# Patient Record
Sex: MALE | Race: WHITE
[De-identification: names, ages, dates, MRNs, and addresses within clinical notes are randomized per-mention and may not be internally consistent; named-entity substitution may affect disease eponyms.]

---

## 2019-02-18 ENCOUNTER — HOSPITAL ENCOUNTER (EMERGENCY)
Dept: HOSPITAL 72 - EMR | Age: 69
Discharge: HOME | End: 2019-02-18
Payer: MEDICARE

## 2019-02-18 VITALS — WEIGHT: 175 LBS | HEIGHT: 63 IN | BODY MASS INDEX: 31.01 KG/M2

## 2019-02-18 VITALS — SYSTOLIC BLOOD PRESSURE: 130 MMHG | DIASTOLIC BLOOD PRESSURE: 83 MMHG

## 2019-02-18 VITALS — DIASTOLIC BLOOD PRESSURE: 73 MMHG | SYSTOLIC BLOOD PRESSURE: 121 MMHG

## 2019-02-18 VITALS — DIASTOLIC BLOOD PRESSURE: 81 MMHG | SYSTOLIC BLOOD PRESSURE: 153 MMHG

## 2019-02-18 DIAGNOSIS — Z85.51: ICD-10-CM
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
## 2019-02-18 DIAGNOSIS — S29.011A: Primary | ICD-10-CM

## 2019-02-18 DIAGNOSIS — E78.00: ICD-10-CM

## 2019-02-18 DIAGNOSIS — I10: ICD-10-CM

## 2019-02-18 LAB
ADD MANUAL DIFF: YES
ALBUMIN SERPL-MCNC: 4 G/DL (ref 3.4–5)
ALBUMIN/GLOB SERPL: 1 {RATIO} (ref 1–2.7)
ALP SERPL-CCNC: 92 U/L (ref 46–116)
ALT SERPL-CCNC: 61 U/L (ref 12–78)
ANION GAP SERPL CALC-SCNC: 15 MMOL/L (ref 5–15)
AST SERPL-CCNC: 35 U/L (ref 15–37)
BILIRUB SERPL-MCNC: 0.9 MG/DL (ref 0.2–1)
BUN SERPL-MCNC: 27 MG/DL (ref 7–18)
CALCIUM SERPL-MCNC: 9.1 MG/DL (ref 8.5–10.1)
CHLORIDE SERPL-SCNC: 107 MMOL/L (ref 98–107)
CK MB SERPL-MCNC: 1.1 NG/ML (ref 0–3.6)
CK SERPL-CCNC: 81 U/L (ref 26–308)
CO2 SERPL-SCNC: 18 MMOL/L (ref 21–32)
CREAT SERPL-MCNC: 1.7 MG/DL (ref 0.55–1.3)
ERYTHROCYTE [DISTWIDTH] IN BLOOD BY AUTOMATED COUNT: 12 % (ref 11.6–14.8)
GLOBULIN SER-MCNC: 4.1 G/DL
HCT VFR BLD CALC: 44.7 % (ref 42–52)
HGB BLD-MCNC: 15 G/DL (ref 14.2–18)
MCV RBC AUTO: 92 FL (ref 80–99)
PLATELET # BLD: 145 K/UL (ref 150–450)
POTASSIUM SERPL-SCNC: 4 MMOL/L (ref 3.5–5.1)
RBC # BLD AUTO: 4.84 M/UL (ref 4.7–6.1)
SODIUM SERPL-SCNC: 140 MMOL/L (ref 136–145)
WBC # BLD AUTO: 11.1 K/UL (ref 4.8–10.8)

## 2019-02-18 PROCEDURE — 93005 ELECTROCARDIOGRAM TRACING: CPT

## 2019-02-18 PROCEDURE — 82550 ASSAY OF CK (CPK): CPT

## 2019-02-18 PROCEDURE — 85025 COMPLETE CBC W/AUTO DIFF WBC: CPT

## 2019-02-18 PROCEDURE — 84484 ASSAY OF TROPONIN QUANT: CPT

## 2019-02-18 PROCEDURE — 99284 EMERGENCY DEPT VISIT MOD MDM: CPT

## 2019-02-18 PROCEDURE — 82553 CREATINE MB FRACTION: CPT

## 2019-02-18 PROCEDURE — 80053 COMPREHEN METABOLIC PANEL: CPT

## 2019-02-18 PROCEDURE — 96374 THER/PROPH/DIAG INJ IV PUSH: CPT

## 2019-02-18 PROCEDURE — 85007 BL SMEAR W/DIFF WBC COUNT: CPT

## 2019-02-18 PROCEDURE — 71045 X-RAY EXAM CHEST 1 VIEW: CPT

## 2019-02-18 PROCEDURE — 83880 ASSAY OF NATRIURETIC PEPTIDE: CPT

## 2019-02-18 PROCEDURE — 36415 COLL VENOUS BLD VENIPUNCTURE: CPT

## 2019-02-18 NOTE — NUR
ED Nurse Note:



A/OX4. BROUGHT IN BY RA 63 FROM HOME DUE TO SOB FROM PAIN 10/10 ON LEFT FLANK 
PAIN SINCE THIS MORNING. RA 93%,PT'S WIFE REQUESTED OXYGEN. PLACED NC 2L/MIN, 
O2 SAT OF 96%.

## 2019-02-18 NOTE — NUR
ED Nurse Note:





pt is cleared to be d/c per ERMD, pt discharge/aftercare instruction and 
prescription provided, pt education done via discussion and hand out, iv d/c 
and dressing applied, wristband removed, pt advised to follow up with pcp or 
return to ed if s/s worsen or new sx develop, pt verbalized understanding and 
agrees with plan. all belongings left with pt, pt vss, ambulatory w/ steady 
gait, resp even and unlabored.

## 2019-02-18 NOTE — EMERGENCY ROOM REPORT
History of Present Illness


General


Chief Complaint:  Dyspnea/Respdistress


Source:  Patient





Present Illness


HPI


68-year-old M presents ED for evaluation.  Brought in by EMS for shortness of 

breath.  Started this morning.  When he woke up.  Patient actually describes 

pain on the left side of the ribs area sharp, 8 out of 10, nonradiating.  

States the pain caused him to have shortness of breath.  Denies shortness 

breath at this time but describes the pain.  Worse with twisting and bending.  

Denies chest pain.  No other aggravating relieving factors.  Denies any other 

associated symptoms


Allergies:  


Coded Allergies:  


     No Known Allergies (Unverified , 2/18/19)





Patient History


Past Medical History:  HTN, other - bladder


Past Surgical History:  none


Pertinent Family History:  none


Social History:  Denies: smoking, alcohol use, drug use


Immunizations:  UTD


Reviewed Nursing Documentation:  PMH: Agreed; PSxH: Agreed





Nursing Documentation-PMH


Hx Hypertension:  Yes - High cholesterol


Hx Cancer:  Yes - Bladder





Review of Systems


All Other Systems:  negative except mentioned in HPI





Physical Exam





Vital Signs








  Date Time  Temp Pulse Resp B/P (MAP) Pulse Ox O2 Delivery O2 Flow Rate FiO2


 


2/18/19 08:45 98.4 88 20 115/82 92 Room Air  


 


2/18/19 11:43       2.0 








Sp02 EP Interpretation:  reviewed, normal


General Appearance:  no apparent distress, alert, GCS 15, non-toxic


Head:  normocephalic, atraumatic


Eyes:  bilateral eye normal inspection, bilateral eye PERRL


ENT:  hearing grossly normal, normal pharynx, no angioedema, normal voice


Neck:  full range of motion, supple/symm/no masses


Respiratory:  lungs clear, normal breath sounds, speaking full sentences, other 

- Reproducible left-sided lateral pain


Cardiovascular #1:  regular rate, rhythm, no edema


Cardiovascular #2:  2+ carotid (R), 2+ carotid (L), 2+ radial (R), 2+ radial (L)

, 2+ dorsalis pedis (R), 2+ dorsalis pedis (L)


Gastrointestinal:  normal bowel sounds, non tender, soft, non-distended, no 

guarding, no rebound


Rectal:  deferred


Genitourinary:  normal inspection, no CVA tenderness


Musculoskeletal:  back normal, gait/station normal, normal range of motion, non-

tender


Neurologic:  alert, oriented x3, responsive, motor strength/tone normal, 

sensory intact, speech normal


Psychiatric:  judgement/insight normal, memory normal, mood/affect normal, no 

suicidal/homicidal ideation


Reflexes:  3+ bicep (R), 3+ bicep (L), 3+ tricep (R), 3+ tricep (L), 3+ knee (R)

, 3+ knee (L)


Skin:  normal color, no rash, warm/dry, well hydrated


Lymphatic:  no adenopathy





Medical Decision Making


Diagnostic Impression:  


 Primary Impression:  


 Muscle strain of chest wall


 Qualified Codes:  S29.011A - Strain of muscle and tendon of front wall of 

thorax, initial encounter


ER Course


Hospital Course 


68-year-old male presents ED complaining of reproducible chest wall pain 





Differential diagnoses include: Rib fracture, MI/unstable angina, contusion, 

muscle strain





Clinical course


Patient placed on stretcher.  After initial history and physical I ordered labs

, EKG, chest x-ray.  





There is lateral reproducible chest wall pain.  Lungs clear.  I ordered Toradol

, Robaxin, Lidoderm





labs reviewed- cr 1.7,troponins negative, no leukocytosis, hemoglobin/

hematocrit stable


EKG - NSR no acute ischemic changes interpreted by me


Chest x-ray-no cardiomegaly, no rib fracture, no pneumothorax, no acute process





Discussed findings with patient.  Safe for discharge and close outpatient follow

-up.  states he has a PMD





I.  I feel this is a highly complex case requiring extensive working including 

EKG/Rhythm strip, Xray/CT/US, Blood/urine lab work, repeat exams while in ED, 

and administration of strong opiates/narcotics for pain control, admission to 

hospital or close patient follow up.  





Diagnosis -muscle strain of chest wall 





Stable and discharged to home with prescription for Tylenol, Robaxin, Lidoderm.

  Instructed to followup with PMD.  Return to ED if symptoms recur or worsen





Labs








Test


  2/18/19


09:20


 


White Blood Count


  11.1 K/UL


(4.8-10.8)


 


Red Blood Count


  4.84 M/UL


(4.70-6.10)


 


Hemoglobin


  15.0 G/DL


(14.2-18.0)


 


Hematocrit


  44.7 %


(42.0-52.0)


 


Mean Corpuscular Volume 92 FL (80-99) 


 


Mean Corpuscular Hemoglobin


  31.0 PG


(27.0-31.0)


 


Mean Corpuscular Hemoglobin


Concent 33.6 G/DL


(32.0-36.0)


 


Red Cell Distribution Width


  12.0 %


(11.6-14.8)


 


Platelet Count


  145 K/UL


(150-450)


 


Mean Platelet Volume


  9.4 FL


(6.5-10.1)


 


Neutrophils (%) (Auto)  % (45.0-75.0) 


 


Lymphocytes (%) (Auto)  % (20.0-45.0) 


 


Monocytes (%) (Auto)  % (1.0-10.0) 


 


Eosinophils (%) (Auto)  % (0.0-3.0) 


 


Basophils (%) (Auto)  % (0.0-2.0) 


 


Differential Total Cells


Counted 100 


 


 


Neutrophils % (Manual) 78 % (45-75) 


 


Lymphocytes % (Manual) 14 % (20-45) 


 


Monocytes % (Manual) 3 % (1-10) 


 


Eosinophils % (Manual) 1 % (0-3) 


 


Basophils % (Manual) 0 % (0-2) 


 


Band Neutrophils 4 % (0-8) 


 


Platelet Estimate Decreased 


 


Platelet Morphology Normal 


 


Red Blood Cell Morphology Normal 


 


Sodium Level


  140 MMOL/L


(136-145)


 


Potassium Level


  4.0 MMOL/L


(3.5-5.1)


 


Chloride Level


  107 MMOL/L


()


 


Carbon Dioxide Level


  18 MMOL/L


(21-32)


 


Anion Gap


  15 mmol/L


(5-15)


 


Blood Urea Nitrogen


  27 mg/dL


(7-18)


 


Creatinine


  1.7 MG/DL


(0.55-1.30)


 


Estimat Glomerular Filtration


Rate 40.5 mL/min


(>60)


 


Glucose Level


  129 MG/DL


()


 


Calcium Level


  9.1 MG/DL


(8.5-10.1)


 


Total Bilirubin


  0.9 MG/DL


(0.2-1.0)


 


Aspartate Amino Transf


(AST/SGOT) 35 U/L (15-37) 


 


 


Alanine Aminotransferase


(ALT/SGPT) 61 U/L (12-78) 


 


 


Alkaline Phosphatase


  92 U/L


()


 


Total Creatine Kinase


  81 U/L


()


 


Creatine Kinase MB


  1.1 NG/ML


(0.0-3.6)


 


Creatine Kinase MB Relative


Index 1.3 


 


 


Troponin I


  0.000 ng/mL


(0.000-0.056)


 


Pro-B-Type Natriuretic Peptide


  74 pg/mL


(0-125)


 


Total Protein


  8.1 G/DL


(6.4-8.2)


 


Albumin


  4.0 G/DL


(3.4-5.0)


 


Globulin 4.1 g/dL 


 


Albumin/Globulin Ratio 1.0 (1.0-2.7) 








EKG Diagnostic Results


Rate:  normal


Rhythm:  NSR


ST Segments:  no acute changes


ASA given to the pt in ED:  No





Rhythm Strip Diag. Results


EP Interpretation:  yes


Rhythm:  NSR, no PVC's, no ectopy





Chest X-Ray Diagnostic Results


Chest X-Ray Diagnostic Results :  


   Chest X-Ray Ordered:  Yes


   # of Views/Limited/Complete:  1 View


   Indication:  Chest Pain


   EP Interpretation:  Yes


   Interpretation:  no consolidation, no effusion, no pneumothorax, no acute 

cardiopulmonary disease


   Impression:  No acute disease


   Electronically Signed by:  Electronically signed by Prashant Ashraf MD





Last Vital Signs








  Date Time  Temp Pulse Resp B/P (MAP) Pulse Ox O2 Delivery O2 Flow Rate FiO2


 


2/18/19 11:46 98.2       


 


2/18/19 11:43  80 16 121/73 97 Nasal Cannula 2.0 








Status:  improved


Disposition:  HOME, SELF-CARE


Condition:  Stable


Scripts


Lidocaine (Lidoderm) 1 Each Adh..patch


1 PATCH TOPIC DAILY, #7 PATCH 0 Refills


   Patch(es) may remain in place for up to 12 hours in any 24-hour


   period.


   Prov: Prashant Ashraf MD         2/18/19 


Methocarbamol* (ROBAXIN-750*) 750 Mg Tablet


750 MG PO TID, #21 TAB 0 Refills


   Prov: Prashant Ashraf MD         2/18/19 


Acetaminophen* (TYLENOL EXTRA STRENGTH*) 500 Mg Tablet


500 MG ORAL Q8H PRN for Prn Headache/Temp > 101, #30 TAB 0 Refills


   Prov: Prashant Ashraf MD         2/18/19


Referrals:  


HENRI GRIGGS MD











NOT CHOSEN [facility]/MD,REFERRING (PCP)


Patient Instructions:  Chest Wall Pain, Easy-to-Read











Prashant Ashraf MD Feb 18, 2019 16:00

## 2019-02-19 NOTE — CARDIOLOGY REPORT
--------------- APPROVED REPORT --------------





EKG Measurement

Heart [id number]

LA [id number]

[id number]

[id number]

[id number]





Normal sinus rhythm

Cannot rule out Anterior infarct, age undetermined

Abnormal ECG